# Patient Record
Sex: FEMALE | Race: OTHER | NOT HISPANIC OR LATINO
[De-identification: names, ages, dates, MRNs, and addresses within clinical notes are randomized per-mention and may not be internally consistent; named-entity substitution may affect disease eponyms.]

---

## 2017-02-17 PROBLEM — Z00.00 ENCOUNTER FOR PREVENTIVE HEALTH EXAMINATION: Status: ACTIVE | Noted: 2017-02-17

## 2017-02-21 ENCOUNTER — APPOINTMENT (OUTPATIENT)
Dept: CARDIOTHORACIC SURGERY | Facility: CLINIC | Age: 82
End: 2017-02-21

## 2017-02-21 VITALS
WEIGHT: 220 LBS | HEART RATE: 90 BPM | RESPIRATION RATE: 16 BRPM | OXYGEN SATURATION: 99 % | SYSTOLIC BLOOD PRESSURE: 133 MMHG | BODY MASS INDEX: 44.35 KG/M2 | HEIGHT: 59 IN | DIASTOLIC BLOOD PRESSURE: 82 MMHG

## 2017-02-21 DIAGNOSIS — E78.5 HYPERLIPIDEMIA, UNSPECIFIED: ICD-10-CM

## 2017-02-21 DIAGNOSIS — F41.9 ANXIETY DISORDER, UNSPECIFIED: ICD-10-CM

## 2017-02-21 DIAGNOSIS — Z87.891 PERSONAL HISTORY OF NICOTINE DEPENDENCE: ICD-10-CM

## 2017-02-21 DIAGNOSIS — I25.10 ATHEROSCLEROTIC HEART DISEASE OF NATIVE CORONARY ARTERY W/OUT ANGINA PECTORIS: ICD-10-CM

## 2017-02-21 DIAGNOSIS — I10 ESSENTIAL (PRIMARY) HYPERTENSION: ICD-10-CM

## 2017-02-21 DIAGNOSIS — I35.0 NONRHEUMATIC AORTIC (VALVE) STENOSIS: ICD-10-CM

## 2017-02-21 DIAGNOSIS — I49.5 SICK SINUS SYNDROME: ICD-10-CM

## 2017-02-21 DIAGNOSIS — I34.0 NONRHEUMATIC MITRAL (VALVE) INSUFFICIENCY: ICD-10-CM

## 2017-02-21 RX ORDER — MIRTAZAPINE 15 MG/1
15 TABLET, FILM COATED ORAL
Refills: 0 | Status: ACTIVE | COMMUNITY

## 2017-02-21 RX ORDER — ATENOLOL 25 MG/1
25 TABLET ORAL
Refills: 0 | Status: ACTIVE | COMMUNITY

## 2017-02-21 RX ORDER — FLUPHENAZINE HCL 1 MG
1 TABLET ORAL
Refills: 0 | Status: ACTIVE | COMMUNITY

## 2017-02-21 RX ORDER — MAGNESIUM OXIDE 400 MG
400 (241.3 MG) TABLET ORAL
Refills: 0 | Status: ACTIVE | COMMUNITY

## 2017-02-21 RX ORDER — SIMVASTATIN 20 MG/1
20 TABLET, FILM COATED ORAL
Refills: 0 | Status: ACTIVE | COMMUNITY

## 2017-03-17 ENCOUNTER — OUTPATIENT (OUTPATIENT)
Dept: OUTPATIENT SERVICES | Facility: HOSPITAL | Age: 82
LOS: 1 days | End: 2017-03-17
Payer: MEDICARE

## 2017-03-17 DIAGNOSIS — I35.0 NONRHEUMATIC AORTIC (VALVE) STENOSIS: ICD-10-CM

## 2017-03-17 PROCEDURE — 71275 CT ANGIOGRAPHY CHEST: CPT | Mod: 26

## 2017-03-17 PROCEDURE — 74174 CTA ABD&PLVS W/CONTRAST: CPT

## 2017-03-17 PROCEDURE — 74174 CTA ABD&PLVS W/CONTRAST: CPT | Mod: 26

## 2017-03-17 PROCEDURE — 71275 CT ANGIOGRAPHY CHEST: CPT

## 2017-03-21 ENCOUNTER — OUTPATIENT (OUTPATIENT)
Dept: OUTPATIENT SERVICES | Facility: HOSPITAL | Age: 82
LOS: 1 days | End: 2017-03-21
Payer: MEDICARE

## 2017-03-21 VITALS
SYSTOLIC BLOOD PRESSURE: 115 MMHG | DIASTOLIC BLOOD PRESSURE: 56 MMHG | HEIGHT: 61 IN | RESPIRATION RATE: 16 BRPM | TEMPERATURE: 97 F | WEIGHT: 165.35 LBS | HEART RATE: 61 BPM

## 2017-03-21 DIAGNOSIS — Z90.710 ACQUIRED ABSENCE OF BOTH CERVIX AND UTERUS: Chronic | ICD-10-CM

## 2017-03-21 DIAGNOSIS — Z01.818 ENCOUNTER FOR OTHER PREPROCEDURAL EXAMINATION: ICD-10-CM

## 2017-03-21 DIAGNOSIS — I35.0 NONRHEUMATIC AORTIC (VALVE) STENOSIS: ICD-10-CM

## 2017-03-21 LAB
ALBUMIN SERPL ELPH-MCNC: 3.8 G/DL — SIGNIFICANT CHANGE UP (ref 3.3–5.2)
ALP SERPL-CCNC: 86 U/L — SIGNIFICANT CHANGE UP (ref 40–120)
ALT FLD-CCNC: 8 U/L — SIGNIFICANT CHANGE UP
ANION GAP SERPL CALC-SCNC: 11 MMOL/L — SIGNIFICANT CHANGE UP (ref 5–17)
APTT BLD: 24.7 SEC — LOW (ref 27.5–37.4)
AST SERPL-CCNC: 17 U/L — SIGNIFICANT CHANGE UP
BASOPHILS # BLD AUTO: 0 K/UL — SIGNIFICANT CHANGE UP (ref 0–0.2)
BASOPHILS NFR BLD AUTO: 0.2 % — SIGNIFICANT CHANGE UP (ref 0–2)
BILIRUB SERPL-MCNC: 0.4 MG/DL — SIGNIFICANT CHANGE UP (ref 0.4–2)
BUN SERPL-MCNC: 20 MG/DL — SIGNIFICANT CHANGE UP (ref 8–20)
CALCIUM SERPL-MCNC: 9.4 MG/DL — SIGNIFICANT CHANGE UP (ref 8.6–10.2)
CHLORIDE SERPL-SCNC: 100 MMOL/L — SIGNIFICANT CHANGE UP (ref 98–107)
CO2 SERPL-SCNC: 29 MMOL/L — SIGNIFICANT CHANGE UP (ref 22–29)
CREAT SERPL-MCNC: 1.01 MG/DL — SIGNIFICANT CHANGE UP (ref 0.5–1.3)
EOSINOPHIL # BLD AUTO: 0.4 K/UL — SIGNIFICANT CHANGE UP (ref 0–0.5)
EOSINOPHIL NFR BLD AUTO: 9.1 % — HIGH (ref 0–6)
GLUCOSE SERPL-MCNC: 73 MG/DL — SIGNIFICANT CHANGE UP (ref 70–115)
HBA1C BLD-MCNC: 4.2 % — SIGNIFICANT CHANGE UP (ref 4–5.6)
HCT VFR BLD CALC: 40.1 % — SIGNIFICANT CHANGE UP (ref 37–47)
HGB BLD-MCNC: 12.6 G/DL — SIGNIFICANT CHANGE UP (ref 12–16)
INR BLD: 1 RATIO — SIGNIFICANT CHANGE UP (ref 0.88–1.16)
LYMPHOCYTES # BLD AUTO: 2 K/UL — SIGNIFICANT CHANGE UP (ref 1–4.8)
LYMPHOCYTES # BLD AUTO: 41.7 % — SIGNIFICANT CHANGE UP (ref 20–55)
MCHC RBC-ENTMCNC: 28.8 PG — SIGNIFICANT CHANGE UP (ref 27–31)
MCHC RBC-ENTMCNC: 31.4 G/DL — LOW (ref 32–36)
MCV RBC AUTO: 91.8 FL — SIGNIFICANT CHANGE UP (ref 81–99)
MONOCYTES # BLD AUTO: 0.5 K/UL — SIGNIFICANT CHANGE UP (ref 0–0.8)
MONOCYTES NFR BLD AUTO: 9.9 % — SIGNIFICANT CHANGE UP (ref 3–10)
MRSA PCR RESULT.: SIGNIFICANT CHANGE UP
NEUTROPHILS # BLD AUTO: 1.9 K/UL — SIGNIFICANT CHANGE UP (ref 1.8–8)
NEUTROPHILS NFR BLD AUTO: 39.1 % — SIGNIFICANT CHANGE UP (ref 37–73)
NT-PROBNP SERPL-SCNC: 484 PG/ML — HIGH (ref 0–300)
PLATELET # BLD AUTO: 142 K/UL — LOW (ref 150–400)
POTASSIUM SERPL-MCNC: 4.9 MMOL/L — SIGNIFICANT CHANGE UP (ref 3.5–5.3)
POTASSIUM SERPL-SCNC: 4.9 MMOL/L — SIGNIFICANT CHANGE UP (ref 3.5–5.3)
PREALB SERPL-MCNC: 23 MG/DL — SIGNIFICANT CHANGE UP (ref 18–38)
PROT SERPL-MCNC: 6.8 G/DL — SIGNIFICANT CHANGE UP (ref 6.6–8.7)
PROTHROM AB SERPL-ACNC: 11 SEC — SIGNIFICANT CHANGE UP (ref 10–13.1)
RBC # BLD: 4.37 M/UL — LOW (ref 4.4–5.2)
RBC # FLD: 13.2 % — SIGNIFICANT CHANGE UP (ref 11–15.6)
S AUREUS DNA NOSE QL NAA+PROBE: SIGNIFICANT CHANGE UP
SODIUM SERPL-SCNC: 140 MMOL/L — SIGNIFICANT CHANGE UP (ref 135–145)
T3 SERPL-MCNC: 98 NG/DL — SIGNIFICANT CHANGE UP (ref 80–200)
T4 AB SER-ACNC: 6.9 UG/DL — SIGNIFICANT CHANGE UP (ref 4.5–12)
TSH SERPL-MCNC: 2.24 UIU/ML — SIGNIFICANT CHANGE UP (ref 0.27–4.2)
WBC # BLD: 4.8 K/UL — SIGNIFICANT CHANGE UP (ref 4.8–10.8)
WBC # FLD AUTO: 4.8 K/UL — SIGNIFICANT CHANGE UP (ref 4.8–10.8)

## 2017-03-21 PROCEDURE — 93010 ELECTROCARDIOGRAM REPORT: CPT

## 2017-03-21 RX ORDER — CEFUROXIME AXETIL 250 MG
1500 TABLET ORAL ONCE
Qty: 0 | Refills: 0 | Status: DISCONTINUED | OUTPATIENT
Start: 2017-03-24 | End: 2017-03-25

## 2017-03-21 NOTE — PATIENT PROFILE ADULT. - PMH
Aortic stenosis    Cerebrovascular accident (CVA), unspecified mechanism  per daughter  Hypertension    Macular degeneration

## 2017-03-21 NOTE — H&P PST ADULT - PMH
Aortic stenosis    Cerebrovascular accident (CVA), unspecified mechanism  per daughter  Hypertension    Macular degeneration Aortic stenosis    Cerebrovascular accident (CVA), unspecified mechanism  per daughter  Dyslipidemia    Hypertension    Macular degeneration    Pacemaker

## 2017-03-21 NOTE — H&P PST ADULT - NSANTHOSAYNRD_GEN_A_CORE
No. RODNEY screening performed.  STOP BANG Legend: 0-2 = LOW Risk; 3-4 = INTERMEDIATE Risk; 5-8 = HIGH Risk

## 2017-03-22 DIAGNOSIS — Z01.818 ENCOUNTER FOR OTHER PREPROCEDURAL EXAMINATION: ICD-10-CM

## 2017-03-22 DIAGNOSIS — I35.0 NONRHEUMATIC AORTIC (VALVE) STENOSIS: ICD-10-CM

## 2017-03-22 LAB
BLD GP AB SCN SERPL QL: SIGNIFICANT CHANGE UP
TYPE + AB SCN PNL BLD: SIGNIFICANT CHANGE UP

## 2017-03-23 PROCEDURE — G0463: CPT

## 2017-03-23 PROCEDURE — 87641 MR-STAPH DNA AMP PROBE: CPT

## 2017-03-23 PROCEDURE — 85610 PROTHROMBIN TIME: CPT

## 2017-03-23 PROCEDURE — 86901 BLOOD TYPING SEROLOGIC RH(D): CPT

## 2017-03-23 PROCEDURE — 87640 STAPH A DNA AMP PROBE: CPT

## 2017-03-23 PROCEDURE — 84443 ASSAY THYROID STIM HORMONE: CPT

## 2017-03-23 PROCEDURE — 86850 RBC ANTIBODY SCREEN: CPT

## 2017-03-23 PROCEDURE — 93005 ELECTROCARDIOGRAM TRACING: CPT

## 2017-03-23 PROCEDURE — 85730 THROMBOPLASTIN TIME PARTIAL: CPT

## 2017-03-23 PROCEDURE — 84134 ASSAY OF PREALBUMIN: CPT

## 2017-03-23 PROCEDURE — 84436 ASSAY OF TOTAL THYROXINE: CPT

## 2017-03-23 PROCEDURE — 84480 ASSAY TRIIODOTHYRONINE (T3): CPT

## 2017-03-23 PROCEDURE — 85027 COMPLETE CBC AUTOMATED: CPT

## 2017-03-23 PROCEDURE — 86920 COMPATIBILITY TEST SPIN: CPT

## 2017-03-23 PROCEDURE — 83036 HEMOGLOBIN GLYCOSYLATED A1C: CPT

## 2017-03-23 PROCEDURE — 86900 BLOOD TYPING SEROLOGIC ABO: CPT

## 2017-03-23 PROCEDURE — 83880 ASSAY OF NATRIURETIC PEPTIDE: CPT

## 2017-03-23 PROCEDURE — 80053 COMPREHEN METABOLIC PANEL: CPT

## 2017-03-24 ENCOUNTER — INPATIENT (INPATIENT)
Facility: HOSPITAL | Age: 82
LOS: 0 days | Discharge: HOME | End: 2017-03-25
Attending: THORACIC SURGERY (CARDIOTHORACIC VASCULAR SURGERY) | Admitting: THORACIC SURGERY (CARDIOTHORACIC VASCULAR SURGERY)
Payer: MEDICARE

## 2017-03-24 ENCOUNTER — APPOINTMENT (OUTPATIENT)
Dept: CARDIOTHORACIC SURGERY | Facility: HOSPITAL | Age: 82
End: 2017-03-24

## 2017-03-24 ENCOUNTER — OUTPATIENT (OUTPATIENT)
Dept: OUTPATIENT SERVICES | Facility: HOSPITAL | Age: 82
LOS: 1 days | Discharge: HOME | End: 2017-03-24
Payer: MEDICARE

## 2017-03-24 VITALS
SYSTOLIC BLOOD PRESSURE: 134 MMHG | RESPIRATION RATE: 16 BRPM | OXYGEN SATURATION: 100 % | HEART RATE: 66 BPM | WEIGHT: 165.35 LBS | HEIGHT: 61 IN | DIASTOLIC BLOOD PRESSURE: 62 MMHG | TEMPERATURE: 98 F

## 2017-03-24 DIAGNOSIS — I35.0 NONRHEUMATIC AORTIC (VALVE) STENOSIS: ICD-10-CM

## 2017-03-24 DIAGNOSIS — E78.5 HYPERLIPIDEMIA, UNSPECIFIED: ICD-10-CM

## 2017-03-24 DIAGNOSIS — I10 ESSENTIAL (PRIMARY) HYPERTENSION: ICD-10-CM

## 2017-03-24 DIAGNOSIS — Z90.710 ACQUIRED ABSENCE OF BOTH CERVIX AND UTERUS: Chronic | ICD-10-CM

## 2017-03-24 DIAGNOSIS — Z95.0 PRESENCE OF CARDIAC PACEMAKER: ICD-10-CM

## 2017-03-24 DIAGNOSIS — Z86.73 PERSONAL HISTORY OF TRANSIENT ISCHEMIC ATTACK (TIA), AND CEREBRAL INFARCTION WITHOUT RESIDUAL DEFICITS: ICD-10-CM

## 2017-03-24 LAB
ABO RH CONFIRMATION: SIGNIFICANT CHANGE UP
ALBUMIN SERPL ELPH-MCNC: 3 G/DL — LOW (ref 3.3–5.2)
ALP SERPL-CCNC: 66 U/L — SIGNIFICANT CHANGE UP (ref 40–120)
ALT FLD-CCNC: 7 U/L — SIGNIFICANT CHANGE UP
ANION GAP SERPL CALC-SCNC: 10 MMOL/L — SIGNIFICANT CHANGE UP (ref 5–17)
APTT BLD: 31 SEC — SIGNIFICANT CHANGE UP (ref 27.5–37.4)
AST SERPL-CCNC: 21 U/L — SIGNIFICANT CHANGE UP
BILIRUB SERPL-MCNC: 0.5 MG/DL — SIGNIFICANT CHANGE UP (ref 0.4–2)
BUN SERPL-MCNC: 18 MG/DL — SIGNIFICANT CHANGE UP (ref 8–20)
CALCIUM SERPL-MCNC: 8.1 MG/DL — LOW (ref 8.6–10.2)
CHLORIDE SERPL-SCNC: 107 MMOL/L — SIGNIFICANT CHANGE UP (ref 98–107)
CK SERPL-CCNC: 97 U/L — SIGNIFICANT CHANGE UP (ref 25–170)
CO2 SERPL-SCNC: 24 MMOL/L — SIGNIFICANT CHANGE UP (ref 22–29)
CREAT SERPL-MCNC: 0.71 MG/DL — SIGNIFICANT CHANGE UP (ref 0.5–1.3)
GAS PNL BLDA: SIGNIFICANT CHANGE UP
GLUCOSE SERPL-MCNC: 100 MG/DL — SIGNIFICANT CHANGE UP (ref 70–115)
HCT VFR BLD CALC: 32.5 % — LOW (ref 37–47)
HGB BLD-MCNC: 10.5 G/DL — LOW (ref 12–16)
INR BLD: 1.07 RATIO — SIGNIFICANT CHANGE UP (ref 0.88–1.16)
MAGNESIUM SERPL-MCNC: 1.9 MG/DL — SIGNIFICANT CHANGE UP (ref 1.8–2.5)
MCHC RBC-ENTMCNC: 29 PG — SIGNIFICANT CHANGE UP (ref 27–31)
MCHC RBC-ENTMCNC: 32.3 G/DL — SIGNIFICANT CHANGE UP (ref 32–36)
MCV RBC AUTO: 89.8 FL — SIGNIFICANT CHANGE UP (ref 81–99)
PLATELET # BLD AUTO: 92 K/UL — LOW (ref 150–400)
POTASSIUM SERPL-MCNC: 4.2 MMOL/L — SIGNIFICANT CHANGE UP (ref 3.5–5.3)
POTASSIUM SERPL-SCNC: 4.2 MMOL/L — SIGNIFICANT CHANGE UP (ref 3.5–5.3)
PROT SERPL-MCNC: 5.3 G/DL — LOW (ref 6.6–8.7)
PROTHROM AB SERPL-ACNC: 11.8 SEC — SIGNIFICANT CHANGE UP (ref 9.8–12.7)
RBC # BLD: 3.62 M/UL — LOW (ref 4.4–5.2)
RBC # FLD: 12.9 % — SIGNIFICANT CHANGE UP (ref 11–15.6)
SODIUM SERPL-SCNC: 141 MMOL/L — SIGNIFICANT CHANGE UP (ref 135–145)
TROPONIN T SERPL-MCNC: 0.34 NG/ML — HIGH (ref 0–0.06)
WBC # BLD: 4.9 K/UL — SIGNIFICANT CHANGE UP (ref 4.8–10.8)
WBC # FLD AUTO: 4.9 K/UL — SIGNIFICANT CHANGE UP (ref 4.8–10.8)

## 2017-03-24 PROCEDURE — 71010: CPT | Mod: 26

## 2017-03-24 PROCEDURE — 93355 ECHO TRANSESOPHAGEAL (TEE): CPT

## 2017-03-24 PROCEDURE — 36569 INSJ PICC 5 YR+ W/O IMAGING: CPT

## 2017-03-24 PROCEDURE — 33362 REPLACE AORTIC VALVE OPEN: CPT | Mod: 62,Q0

## 2017-03-24 RX ORDER — MAGNESIUM OXIDE 400 MG ORAL TABLET 241.3 MG
1 TABLET ORAL
Qty: 0 | Refills: 0 | COMMUNITY

## 2017-03-24 RX ORDER — CHOLECALCIFEROL (VITAMIN D3) 125 MCG
2 CAPSULE ORAL
Qty: 0 | Refills: 0 | COMMUNITY

## 2017-03-24 RX ORDER — ASPIRIN/CALCIUM CARB/MAGNESIUM 324 MG
325 TABLET ORAL DAILY
Qty: 0 | Refills: 0 | Status: DISCONTINUED | OUTPATIENT
Start: 2017-03-25 | End: 2017-03-25

## 2017-03-24 RX ORDER — TRAVOPROST 0.04 MG/ML
1 SOLUTION/ DROPS OPHTHALMIC
Qty: 0 | Refills: 0 | COMMUNITY

## 2017-03-24 RX ORDER — MULTIVIT-MIN/FERROUS GLUCONATE 9 MG/15 ML
1 LIQUID (ML) ORAL
Qty: 0 | Refills: 0 | COMMUNITY

## 2017-03-24 RX ORDER — CEFUROXIME AXETIL 250 MG
1500 TABLET ORAL EVERY 8 HOURS
Qty: 0 | Refills: 0 | Status: DISCONTINUED | OUTPATIENT
Start: 2017-03-24 | End: 2017-03-25

## 2017-03-24 RX ORDER — ASPIRIN/CALCIUM CARB/MAGNESIUM 324 MG
325 TABLET ORAL ONCE
Qty: 0 | Refills: 0 | Status: COMPLETED | OUTPATIENT
Start: 2017-03-24 | End: 2017-03-24

## 2017-03-24 RX ORDER — SODIUM CHLORIDE 9 MG/ML
1000 INJECTION INTRAMUSCULAR; INTRAVENOUS; SUBCUTANEOUS
Qty: 0 | Refills: 0 | Status: DISCONTINUED | OUTPATIENT
Start: 2017-03-24 | End: 2017-03-24

## 2017-03-24 RX ORDER — SODIUM CHLORIDE 9 MG/ML
375.5 INJECTION, SOLUTION INTRAVENOUS ONCE
Qty: 0 | Refills: 0 | Status: COMPLETED | OUTPATIENT
Start: 2017-03-24 | End: 2017-03-24

## 2017-03-24 RX ORDER — DOCUSATE SODIUM 100 MG
100 CAPSULE ORAL THREE TIMES A DAY
Qty: 0 | Refills: 0 | Status: DISCONTINUED | OUTPATIENT
Start: 2017-03-24 | End: 2017-03-25

## 2017-03-24 RX ORDER — PANTOPRAZOLE SODIUM 20 MG/1
40 TABLET, DELAYED RELEASE ORAL
Qty: 0 | Refills: 0 | Status: DISCONTINUED | OUTPATIENT
Start: 2017-03-24 | End: 2017-03-25

## 2017-03-24 RX ORDER — SODIUM CHLORIDE 9 MG/ML
250 INJECTION, SOLUTION INTRAVENOUS ONCE
Qty: 0 | Refills: 0 | Status: COMPLETED | OUTPATIENT
Start: 2017-03-24 | End: 2017-03-24

## 2017-03-24 RX ORDER — PREGABALIN 225 MG/1
1 CAPSULE ORAL
Qty: 0 | Refills: 0 | COMMUNITY

## 2017-03-24 RX ORDER — CLOPIDOGREL BISULFATE 75 MG/1
75 TABLET, FILM COATED ORAL ONCE
Qty: 0 | Refills: 0 | Status: DISCONTINUED | OUTPATIENT
Start: 2017-03-24 | End: 2017-03-24

## 2017-03-24 RX ORDER — ATENOLOL 25 MG/1
1 TABLET ORAL
Qty: 0 | Refills: 0 | COMMUNITY

## 2017-03-24 RX ORDER — ACETAMINOPHEN 500 MG
1000 TABLET ORAL ONCE
Qty: 0 | Refills: 0 | Status: COMPLETED | OUTPATIENT
Start: 2017-03-24 | End: 2017-03-24

## 2017-03-24 RX ORDER — NOREPINEPHRINE BITARTRATE/D5W 8 MG/250ML
0.01 PLASTIC BAG, INJECTION (ML) INTRAVENOUS
Qty: 8 | Refills: 0 | Status: DISCONTINUED | OUTPATIENT
Start: 2017-03-24 | End: 2017-03-24

## 2017-03-24 RX ORDER — NYSTATIN CREAM 100000 [USP'U]/G
1 CREAM TOPICAL EVERY 8 HOURS
Qty: 0 | Refills: 0 | Status: DISCONTINUED | OUTPATIENT
Start: 2017-03-24 | End: 2017-03-25

## 2017-03-24 RX ORDER — CLOPIDOGREL BISULFATE 75 MG/1
75 TABLET, FILM COATED ORAL DAILY
Qty: 0 | Refills: 0 | Status: DISCONTINUED | OUTPATIENT
Start: 2017-03-25 | End: 2017-03-25

## 2017-03-24 RX ORDER — SODIUM CHLORIDE 9 MG/ML
3 INJECTION INTRAMUSCULAR; INTRAVENOUS; SUBCUTANEOUS EVERY 8 HOURS
Qty: 0 | Refills: 0 | Status: DISCONTINUED | OUTPATIENT
Start: 2017-03-24 | End: 2017-03-24

## 2017-03-24 RX ORDER — SIMVASTATIN 20 MG/1
1 TABLET, FILM COATED ORAL
Qty: 0 | Refills: 0 | COMMUNITY

## 2017-03-24 RX ORDER — MIRTAZAPINE 45 MG/1
1 TABLET, ORALLY DISINTEGRATING ORAL
Qty: 0 | Refills: 0 | COMMUNITY

## 2017-03-24 RX ORDER — ACETAMINOPHEN 500 MG
2 TABLET ORAL
Qty: 0 | Refills: 0 | COMMUNITY

## 2017-03-24 RX ORDER — HYDROMORPHONE HYDROCHLORIDE 2 MG/ML
0.25 INJECTION INTRAMUSCULAR; INTRAVENOUS; SUBCUTANEOUS
Qty: 0 | Refills: 0 | Status: DISCONTINUED | OUTPATIENT
Start: 2017-03-24 | End: 2017-03-24

## 2017-03-24 RX ORDER — FLUPHENAZINE HYDROCHLORIDE 1 MG/1
1 TABLET, FILM COATED ORAL
Qty: 0 | Refills: 0 | COMMUNITY

## 2017-03-24 RX ORDER — POLYETHYLENE GLYCOL 3350 17 G/17G
17 POWDER, FOR SOLUTION ORAL
Qty: 0 | Refills: 0 | COMMUNITY

## 2017-03-24 RX ADMIN — HYDROMORPHONE HYDROCHLORIDE 0.25 MILLIGRAM(S): 2 INJECTION INTRAMUSCULAR; INTRAVENOUS; SUBCUTANEOUS at 18:30

## 2017-03-24 RX ADMIN — Medication 100 MILLIGRAM(S): at 22:21

## 2017-03-24 RX ADMIN — Medication 400 MILLIGRAM(S): at 11:18

## 2017-03-24 RX ADMIN — HYDROMORPHONE HYDROCHLORIDE 0.25 MILLIGRAM(S): 2 INJECTION INTRAMUSCULAR; INTRAVENOUS; SUBCUTANEOUS at 18:00

## 2017-03-24 RX ADMIN — NYSTATIN CREAM 1 APPLICATION(S): 100000 CREAM TOPICAL at 22:20

## 2017-03-24 RX ADMIN — HYDROMORPHONE HYDROCHLORIDE 0.25 MILLIGRAM(S): 2 INJECTION INTRAMUSCULAR; INTRAVENOUS; SUBCUTANEOUS at 18:15

## 2017-03-24 RX ADMIN — Medication 325 MILLIGRAM(S): at 22:23

## 2017-03-24 RX ADMIN — NYSTATIN CREAM 1 APPLICATION(S): 100000 CREAM TOPICAL at 13:08

## 2017-03-24 RX ADMIN — Medication 1000 MILLIGRAM(S): at 11:33

## 2017-03-24 RX ADMIN — Medication 100 MILLIGRAM(S): at 17:12

## 2017-03-24 RX ADMIN — SODIUM CHLORIDE 1000 MILLILITER(S): 9 INJECTION, SOLUTION INTRAVENOUS at 15:27

## 2017-03-24 RX ADMIN — SODIUM CHLORIDE 498.45 MILLILITER(S): 9 INJECTION, SOLUTION INTRAVENOUS at 18:39

## 2017-03-24 RX ADMIN — Medication 100 MILLIGRAM(S): at 15:24

## 2017-03-24 NOTE — CHART NOTE - NSCHARTNOTEFT_GEN_A_CORE
Commercial Bravo Transfemoral TAVR via Right Femoral Cutdown.  NCT# [id number], STS/ACC TVT Registry ID# [id number].

## 2017-03-24 NOTE — BRIEF OPERATIVE NOTE - PROCEDURE
TAVR, transfemoral approach  03/24/2017  Transfemoral TAVR via Right Femoral Cutdown (23mm Dilshad S3) (NCT# [id number]) (STS/ACC TVT Registry ID# [id number])  Active  [email or web address]

## 2017-03-24 NOTE — BRIEF OPERATIVE NOTE - COMMENTS
Invasive Lines: Right Radial Arterial Line, Left Femoral Arterial & Venous Sheaths  IV Medication Infusions: None

## 2017-03-24 NOTE — BRIEF OPERATIVE NOTE - POST-OP DX
Chronic diastolic CHF (congestive heart failure), NYHA class 3  03/24/2017    Active  Dheeraj Wright  Severe aortic stenosis  03/24/2017    Active  Dheeraj Wright

## 2017-03-25 ENCOUNTER — TRANSCRIPTION ENCOUNTER (OUTPATIENT)
Age: 82
End: 2017-03-25

## 2017-03-25 VITALS — SYSTOLIC BLOOD PRESSURE: 119 MMHG | HEART RATE: 63 BPM | OXYGEN SATURATION: 100 % | DIASTOLIC BLOOD PRESSURE: 56 MMHG

## 2017-03-25 DIAGNOSIS — I35.0 NONRHEUMATIC AORTIC (VALVE) STENOSIS: ICD-10-CM

## 2017-03-25 DIAGNOSIS — I10 ESSENTIAL (PRIMARY) HYPERTENSION: ICD-10-CM

## 2017-03-25 LAB
ALBUMIN SERPL ELPH-MCNC: 3 G/DL — LOW (ref 3.3–5.2)
ALP SERPL-CCNC: 70 U/L — SIGNIFICANT CHANGE UP (ref 40–120)
ALT FLD-CCNC: 6 U/L — SIGNIFICANT CHANGE UP
ANION GAP SERPL CALC-SCNC: 7 MMOL/L — SIGNIFICANT CHANGE UP (ref 5–17)
APTT BLD: 30.6 SEC — SIGNIFICANT CHANGE UP (ref 27.5–37.4)
AST SERPL-CCNC: 19 U/L — SIGNIFICANT CHANGE UP
BILIRUB SERPL-MCNC: 0.4 MG/DL — SIGNIFICANT CHANGE UP (ref 0.4–2)
BUN SERPL-MCNC: 19 MG/DL — SIGNIFICANT CHANGE UP (ref 8–20)
CALCIUM SERPL-MCNC: 8.3 MG/DL — LOW (ref 8.6–10.2)
CHLORIDE SERPL-SCNC: 105 MMOL/L — SIGNIFICANT CHANGE UP (ref 98–107)
CK SERPL-CCNC: 53 U/L — SIGNIFICANT CHANGE UP (ref 25–170)
CO2 SERPL-SCNC: 27 MMOL/L — SIGNIFICANT CHANGE UP (ref 22–29)
CREAT SERPL-MCNC: 0.68 MG/DL — SIGNIFICANT CHANGE UP (ref 0.5–1.3)
GLUCOSE SERPL-MCNC: 89 MG/DL — SIGNIFICANT CHANGE UP (ref 70–115)
HCT VFR BLD CALC: 29.9 % — LOW (ref 37–47)
HGB BLD-MCNC: 9.4 G/DL — LOW (ref 12–16)
INR BLD: 1.04 RATIO — SIGNIFICANT CHANGE UP (ref 0.88–1.16)
MAGNESIUM SERPL-MCNC: 2.1 MG/DL — SIGNIFICANT CHANGE UP (ref 1.8–2.5)
MCHC RBC-ENTMCNC: 28.8 PG — SIGNIFICANT CHANGE UP (ref 27–31)
MCHC RBC-ENTMCNC: 31.4 G/DL — LOW (ref 32–36)
MCV RBC AUTO: 91.7 FL — SIGNIFICANT CHANGE UP (ref 81–99)
PLATELET # BLD AUTO: 97 K/UL — LOW (ref 150–400)
POTASSIUM SERPL-MCNC: 4.7 MMOL/L — SIGNIFICANT CHANGE UP (ref 3.5–5.3)
POTASSIUM SERPL-SCNC: 4.7 MMOL/L — SIGNIFICANT CHANGE UP (ref 3.5–5.3)
PROT SERPL-MCNC: 5.1 G/DL — LOW (ref 6.6–8.7)
PROTHROM AB SERPL-ACNC: 11.4 SEC — SIGNIFICANT CHANGE UP (ref 9.8–12.7)
RBC # BLD: 3.26 M/UL — LOW (ref 4.4–5.2)
RBC # FLD: 13.2 % — SIGNIFICANT CHANGE UP (ref 11–15.6)
SODIUM SERPL-SCNC: 139 MMOL/L — SIGNIFICANT CHANGE UP (ref 135–145)
TROPONIN T SERPL-MCNC: 0.16 NG/ML — HIGH (ref 0–0.06)
WBC # BLD: 5.1 K/UL — SIGNIFICANT CHANGE UP (ref 4.8–10.8)
WBC # FLD AUTO: 5.1 K/UL — SIGNIFICANT CHANGE UP (ref 4.8–10.8)

## 2017-03-25 PROCEDURE — 82435 ASSAY OF BLOOD CHLORIDE: CPT

## 2017-03-25 PROCEDURE — 85027 COMPLETE CBC AUTOMATED: CPT

## 2017-03-25 PROCEDURE — 82947 ASSAY GLUCOSE BLOOD QUANT: CPT

## 2017-03-25 PROCEDURE — 80053 COMPREHEN METABOLIC PANEL: CPT

## 2017-03-25 PROCEDURE — 83735 ASSAY OF MAGNESIUM: CPT

## 2017-03-25 PROCEDURE — C1889: CPT

## 2017-03-25 PROCEDURE — 93325 DOPPLER ECHO COLOR FLOW MAPG: CPT

## 2017-03-25 PROCEDURE — C1769: CPT

## 2017-03-25 PROCEDURE — 76000 FLUOROSCOPY <1 HR PHYS/QHP: CPT

## 2017-03-25 PROCEDURE — 71045 X-RAY EXAM CHEST 1 VIEW: CPT

## 2017-03-25 PROCEDURE — 33361 REPLACE AORTIC VALVE PERQ: CPT

## 2017-03-25 PROCEDURE — 93005 ELECTROCARDIOGRAM TRACING: CPT

## 2017-03-25 PROCEDURE — 85014 HEMATOCRIT: CPT

## 2017-03-25 PROCEDURE — 84132 ASSAY OF SERUM POTASSIUM: CPT

## 2017-03-25 PROCEDURE — 85610 PROTHROMBIN TIME: CPT

## 2017-03-25 PROCEDURE — 93320 DOPPLER ECHO COMPLETE: CPT

## 2017-03-25 PROCEDURE — 82803 BLOOD GASES ANY COMBINATION: CPT

## 2017-03-25 PROCEDURE — 85730 THROMBOPLASTIN TIME PARTIAL: CPT

## 2017-03-25 PROCEDURE — 82330 ASSAY OF CALCIUM: CPT

## 2017-03-25 PROCEDURE — 82550 ASSAY OF CK (CPK): CPT

## 2017-03-25 PROCEDURE — 93306 TTE W/DOPPLER COMPLETE: CPT

## 2017-03-25 PROCEDURE — 97163 PT EVAL HIGH COMPLEX 45 MIN: CPT

## 2017-03-25 PROCEDURE — 93010 ELECTROCARDIOGRAM REPORT: CPT

## 2017-03-25 PROCEDURE — C1894: CPT

## 2017-03-25 PROCEDURE — L8699: CPT

## 2017-03-25 PROCEDURE — 93312 ECHO TRANSESOPHAGEAL: CPT

## 2017-03-25 PROCEDURE — 83605 ASSAY OF LACTIC ACID: CPT

## 2017-03-25 PROCEDURE — 71010: CPT | Mod: 26

## 2017-03-25 PROCEDURE — 84484 ASSAY OF TROPONIN QUANT: CPT

## 2017-03-25 PROCEDURE — C1887: CPT

## 2017-03-25 PROCEDURE — 84295 ASSAY OF SERUM SODIUM: CPT

## 2017-03-25 PROCEDURE — 92953 TEMPORARY EXTERNAL PACING: CPT

## 2017-03-25 RX ORDER — ASPIRIN/CALCIUM CARB/MAGNESIUM 324 MG
1 TABLET ORAL
Qty: 0 | Refills: 0 | COMMUNITY

## 2017-03-25 RX ORDER — NYSTATIN CREAM 100000 [USP'U]/G
1 CREAM TOPICAL
Qty: 0 | Refills: 0 | COMMUNITY
Start: 2017-03-25

## 2017-03-25 RX ORDER — CLOPIDOGREL BISULFATE 75 MG/1
1 TABLET, FILM COATED ORAL
Qty: 0 | Refills: 0 | COMMUNITY
Start: 2017-03-25

## 2017-03-25 RX ORDER — PANTOPRAZOLE SODIUM 20 MG/1
1 TABLET, DELAYED RELEASE ORAL
Qty: 0 | Refills: 0 | COMMUNITY
Start: 2017-03-25

## 2017-03-25 RX ORDER — DOCUSATE SODIUM 100 MG
1 CAPSULE ORAL
Qty: 0 | Refills: 0 | COMMUNITY
Start: 2017-03-25

## 2017-03-25 RX ORDER — ATENOLOL 25 MG/1
25 TABLET ORAL DAILY
Qty: 0 | Refills: 0 | Status: DISCONTINUED | OUTPATIENT
Start: 2017-03-25 | End: 2017-03-25

## 2017-03-25 RX ORDER — PANTOPRAZOLE SODIUM 20 MG/1
40 TABLET, DELAYED RELEASE ORAL
Qty: 0 | Refills: 0 | Status: DISCONTINUED | OUTPATIENT
Start: 2017-03-25 | End: 2017-03-25

## 2017-03-25 RX ADMIN — PANTOPRAZOLE SODIUM 40 MILLIGRAM(S): 20 TABLET, DELAYED RELEASE ORAL at 07:21

## 2017-03-25 RX ADMIN — Medication 100 MILLIGRAM(S): at 07:20

## 2017-03-25 RX ADMIN — Medication 100 MILLIGRAM(S): at 06:02

## 2017-03-25 RX ADMIN — Medication 100 MILLIGRAM(S): at 11:39

## 2017-03-25 RX ADMIN — Medication 325 MILLIGRAM(S): at 11:39

## 2017-03-25 RX ADMIN — ATENOLOL 25 MILLIGRAM(S): 25 TABLET ORAL at 10:23

## 2017-03-25 RX ADMIN — CLOPIDOGREL BISULFATE 75 MILLIGRAM(S): 75 TABLET, FILM COATED ORAL at 11:39

## 2017-03-25 NOTE — DISCHARGE NOTE ADULT - MEDICATION SUMMARY - MEDICATIONS TO CHANGE
I will SWITCH the dose or number of times a day I take the medications listed below when I get home from the hospital:  None I will SWITCH the dose or number of times a day I take the medications listed below when I get home from the hospital:    Aspirin Enteric Coated 81 mg oral delayed release tablet  -- 1 tab(s) by mouth once a day

## 2017-03-25 NOTE — DISCHARGE NOTE ADULT - NS AS ACTIVITY OBS
Walking-Indoors allowed/Showering allowed/Walking-Outdoors allowed/Do not make important decisions/No Heavy lifting/straining/Do not drive or operate machinery

## 2017-03-25 NOTE — DISCHARGE NOTE ADULT - PATIENT PORTAL LINK FT
“You can access the Follow[employer] Patient Portal, offered by [facility], by registering with the following website: [email or web address]”

## 2017-03-25 NOTE — PROGRESS NOTE ADULT - SUBJECTIVE AND OBJECTIVE BOX
Patient is a 87y old  Female who presents with a chief complaint of SOB. POD #1 TAVR    Interval/Overnight Events: Overnight, patient remained in NSR with PPM, stale hemodynamics, resting comfortably.     VITAL SIGNS:  T(C): 36.4, Max: 36.7 (03-24 @ 10:19)  HR: 60 (60 - 72)  BP: 131/60 (112/56 - 146/66)  ABP: 114/39 (114/39 - 161/64)  ABP(mean): 62 (62 - 99)  RR: 14 (10 - 26)  SpO2: 100% (96% - 100%)      RESPIRATORY:  [] FiO2: 		[] Heliox	[] BiPAP:   [x] NC			[] HFNC:    Liters, FiO2:  [] End-Tidal CO2:  [] Mechanical Ventilation:     ABG - ( 24 Mar 2017 10:50 )  pH: 7.34  /  pCO2: 48    /  pO2: 164   / HCO3: 24    / Base Excess: -0.6  /  SaO2: 100   / Lactate: x          Respiratory Medications:    [] Extubation Readiness Assessed  Comments:    CARDIOVASCULAR    Cardiac Rhythm:	[x] NSR		[] Other:  Comments:    HEMATOLOGIC/ONCOLOGIC:                        10.5   4.9   )-----------( 92       ( 24 Mar 2017 10:49 )             32.5     PT/INR - ( 24 Mar 2017 10:49 )   PT: 11.8 sec;   INR: 1.07 ratio         PTT - ( 24 Mar 2017 10:49 )  PTT:31.0 sec  Transfusions:	[] PRBC	[] Platelets	[] FFP		[] Cryoprecipitate    Hematologic/Oncologic Medications:  aspirin enteric coated 325milliGRAM(s) Oral daily  clopidogrel Tablet 75milliGRAM(s) Oral daily    INFECTIOUS DISEASE:  Antimicrobials/Immunologic Medications:  cefuroxime  IVPB 1500milliGRAM(s) IV Intermittent once  cefuroxime  IVPB 1500milliGRAM(s) IV Intermittent every 8 hours    Cultures:    FLUIDS/ELECTROLYTES/NUTRITION:  I&O's Summary    I & Os for current day (as of 25 Mar 2017 03:52)  =============================================  IN: 1415 ml / OUT: 720 ml / NET: 695 ml    Daily Weight in k.6 (24 Mar 2017 10:19)  24 Mar 2017 10:49    141    |  107    |  18.0   ----------------------------<  100    4.2     |  24.0   |  0.71     Ca    8.1        24 Mar 2017 10:49  Mg     1.9       24 Mar 2017 10:49    TPro  5.3    /  Alb  3.0    /  TBili  0.5    /  DBili  x      /  AST  21     /  ALT  7      /  AlkPhos  66     24 Mar 2017 10:49      Diet:	[x] Regular	[] Soft		[] Clears	[] NPO  .	[] Other:  .	[] NGT		[] NDT		[] GT		[] GJT    Gastrointestinal Medications:  pantoprazole   Suspension 40milliGRAM(s) Oral before breakfast  docusate sodium 100milliGRAM(s) Oral three times a day      OTHER MEDICATIONS:  Endocrine/Metabolic Medications:    Genitourinary Medications:    Topical/Other Medications:  nystatin Powder 1Application(s) Topical every 8 hours      PATIENT CARE ACCESS DEVICES:  [x] Peripheral IV  [] Central Venous Line	[] R	[] L	[] IJ	[] Fem	[] SC			[] Placed:  [] Arterial Line		[] R	[] L	[] PT	[] DP	[] Fem	[] Rad	[] Ax	[] Placed:  [] PICC:				[] Broviac		[] Mediport  [] Urinary Catheter, Date Placed:  [] Necessity of urinary, arterial, and venous catheters discussed    PHYSICAL EXAM:  Respiratory:		[x] Normal  Breath Sounds:		[] Normal  .	Rhonchi	[] Right		[] Left  .	Wheezing	[] Right		[] Left  .	Diminished	[] Right		[] Left  .	Crackles	[] Right		[] Left  Effort:		[x] Even unlabored	[] Nasal Flaring		[] Grunting  .		[] Stridor		[] Retractions		[] Ventilator assisted  Comments:    Cardiovascular:		[x] Normal  Murmur:		[x] None		[] Present:  Capillary Refill		x[] Brisk, less than 2 seconds	[] Prolonged:  Pulses:			[x] Equal and strong		[] Other:  Comments:    Abdominal:	[x] Normal  [x] Soft		[] Distended	[] Tender	[] Taut		[] Rigid		[] BS Absent  Comments:    Skin:		[x] Normal  comments: Patient had R groin cutdown and L groin sheaths, now removed, no hematoma or active bleeding  Edema:		[x] None		[] Generalized	[] 1+	[] 2+	[] 3+	[] 4+  Rash:		[x] None		[] Present:  Comments:    Neurologic:	[x] Normal  		[] Alert		[] Sedated	[] No acute change from baseline  Comments:      IMAGING STUDIES:    Patient and Parent/Guardian updated as to the progress/plan of care:	[x] Yes	[] No
[facility]                                   [address] [place] [address]                                                      PHONE: [phone]                                                         FAX: [phone]  [email or web address]  ---------------------------------------------------------------------------------------------------------------------------------    Overnight events/patient :no complaints      Lasix (Hypotension)  No Known Allergies    MEDICATIONS  (STANDING):  cefuroxime  IVPB 1500milliGRAM(s) IV Intermittent once  docusate sodium 100milliGRAM(s) Oral three times a day  aspirin enteric coated 325milliGRAM(s) Oral daily  clopidogrel Tablet 75milliGRAM(s) Oral daily  cefuroxime  IVPB 1500milliGRAM(s) IV Intermittent every 8 hours  nystatin Powder 1Application(s) Topical every 8 hours  pantoprazole    Tablet 40milliGRAM(s) Oral before breakfast  ATENolol  Tablet 25milliGRAM(s) Oral daily    MEDICATIONS  (PRN):      Vital Signs Last 24 Hrs  T(C): 36.4, Max: 36.7 (-24 @ 10:19)  T(F): 97.5, Max: 98 (03-24 @ 10:19)  HR: 72 (60 - 72)  BP: 110/514 (110/514 - 146/66)  BP(mean): 73 (73 - 95)  RR: 18 (10 - 26)  SpO2: 98% (96% - 100%)  Daily     Daily Weight in k.6 (24 Mar 2017 10:19)  ICU Vital Signs Last 24 Hrs  WILEY STEWART  I&O's Detail    I & Os for current day (as of 25 Mar 2017 08:59)  =============================================  IN:    Oral Fluid: 640 ml    Lactated Ringers IV Bolus: 625 ml    Solution: 100 ml    Solution: 100 ml    Total IN: 1465 ml  ---------------------------------------------  OUT:    Indwelling Catheter - Urethral: 720 ml    Total OUT: 720 ml  ---------------------------------------------  Total NET: 745 ml    I&O's Summary    I & Os for current day (as of 25 Mar 2017 08:59)  =============================================  IN: 1465 ml / OUT: 720 ml / NET: 745 ml    Drug Dosing Weight  WILEY STEWART      PHYSICAL EXAM:  General: Appears well developed, well nourished alert and cooperative.  HEENT: Head; normocephalic, atraumatic.  Eyes: Pupils reactive, cornea wnl.  Neck: Supple, no nodes adenopathy, no NVD or carotid bruit or thyromegaly.  CARDIOVASCULAR: Normal S1 and S2, soft systolic murmur, rub, gallop or lift.   LUNGS: No rales, rhonchi or wheeze. Normal breath sounds bilaterally.  ABDOMEN: Soft, nontender without mass or organomegaly. bowel sounds normoactive.  EXTREMITIES: No clubbing, cyanosis or edema. Distal pulses wnl.   SKIN: warm and dry with normal turgor.  NEURO: Alert/oriented x 3/normal motor exam. No pathologic reflexes.    PSYCH: normal affect.        LABS:                        9.4    5.1   )-----------( 97       ( 25 Mar 2017 04:03 )             29.9     25 Mar 2017 04:03    139    |  105    |  19.0   ----------------------------<  89     4.7     |  27.0   |  0.68     Ca    8.3        25 Mar 2017 04:03  Mg     2.1       25 Mar 2017 04:03    TPro  5.1    /  Alb  3.0    /  TBili  0.4    /  DBili  x      /  AST  19     /  ALT  6      /  AlkPhos  70     25 Mar 2017 04:03    WILEY STEWART  CARDIAC MARKERS ( 25 Mar 2017 04:03 )  x     / 0.16 ng/mL / 53 U/L / x     / x      CARDIAC MARKERS ( 24 Mar 2017 10:49 )  x     / 0.34 ng/mL / 97 U/L / x     / x          PT/INR - ( 25 Mar 2017 04:03 )   PT: 11.4 sec;   INR: 1.04 ratio         PTT - ( 25 Mar 2017 04:03 )  PTT:30.6 sec      RADIOLOGY & ADDITIONAL STUDIES:    INTERPRETATION OF TELEMETRY (personally reviewed):    ECG:Diagnosis Line AV dual-paced rhythm  Abnormal ECG    Confirmed by JOHN HANNA ([id number]) on 3/21/2017 10:20:38 P

## 2017-03-25 NOTE — DISCHARGE NOTE ADULT - HOSPITAL COURSE
patient admitted as same day for TF TAVR on 3/24, no intraop issues. pt was extubated in OR and transferred to CTICU for monitoring. Her groin sheaths were removed. Pt remained hemodynamically stable and is ready for discharge on POD 1.  Her pre discharge TTE showed: patient admitted as same day for TF TAVR on 3/24, no intraop issues. pt was extubated in OR and transferred to CTICU for monitoring. Her groin sheaths were removed. Pt remained hemodynamically stable and is ready for discharge on POD 1.  Her pre discharge TTE showed EF 55%, grade II diastolic dysfunction, mild to mod TR, trivial pericardial effusion.  Pt is stable for discharge back to rehab as discussed with Dr. Mirza.

## 2017-03-25 NOTE — PROGRESS NOTE ADULT - PROBLEM SELECTOR PLAN 1
S/P TAVR. Continue ASA and Plavix. Monitor Platelet count. Follow up ECHO to r/o pleural effusion. F/U ECG to r/o arrhythmias.

## 2017-03-25 NOTE — DISCHARGE NOTE ADULT - CARE PLAN
Goal:	recover from surgery  Instructions for follow-up, activity and diet:	Follow up with Dr. Mirza in 2 weeks. Please call [phone] to arrange appointment  Follow up with cardiology once discharged from rehab. Please call to make an appointment. Dr. Shannon [phone].  Please come to ED or Call Cardio thoracic office at [phone] if Chest pain, Shortness of Breath, persistant Nausea & vomiting, oozing from wounds, 2 lb increase in weight in 24 hours.  please check daily weights.  shower daily. Ok to get incision wet. clean with soap and water. pat dry. do not put any lotions or creams on the wound.  Use interdry for b/l groins to prevent skin breakdown from moisture.  Instructions for follow-up, activity and diet:	. Goal:	recover from surgery  Instructions for follow-up, activity and diet:	Follow up with Dr. Mirza in 2 weeks. Please call [phone] to arrange appointment  Follow up with cardiology once discharged from rehab. Please call to make an appointment. Dr. Shannon [phone].  Please come to ED or Call Cardio thoracic office at [phone] if Chest pain, Shortness of Breath, persistant Nausea & vomiting, oozing from wounds, 2 lb increase in weight in 24 hours.  please check daily weights.  shower daily. Ok to get incision wet. clean with soap and water. pat dry. do not put any lotions or creams on the wound.  Use interdry for b/l groins to prevent skin breakdown from moisture.  Instructions for follow-up, activity and diet:	. Goal:	recover from surgery  Instructions for follow-up, activity and diet:	Follow up with Dr. Mirza in 2 weeks. Please call [phone] to arrange appointment  Follow up with cardiology once discharged from rehab. Please call to make an appointment. Dr. Shannon [phone].  Please come to ED or Call Cardio thoracic office at [phone] if Chest pain, Shortness of Breath, persistant Nausea & vomiting, oozing from wounds, 2 lb increase in weight in 24 hours.  please check daily weights.  shower daily. Ok to get incision wet. clean with soap and water. pat dry. do not put any lotions or creams on the wound.  Use interdry for b/l groins to prevent skin breakdown from moisture.  Instructions for follow-up, activity and diet:	. Principal Discharge DX:	Aortic valve stenosis, unspecified etiology  Goal:	s/p TF- TAVR, recover from surgery  Instructions for follow-up, activity and diet:	Follow up with Dr. Mirza in 2 weeks. Please call [phone] to arrange appointment  Follow up with cardiology once discharged from rehab. Please call to make an appointment. Dr. Shannon [phone].  Please come to ED or Call Cardio thoracic office at [phone] if Chest pain, Shortness of Breath, persistant Nausea & vomiting, oozing from wounds, 2 lb increase in weight in 24 hours.  please check daily weights.  shower daily. Ok to get incision wet. clean with soap and water. pat dry. do not put any lotions or creams on the wound.  Use interdry for b/l groins to prevent skin breakdown from moisture.  Instructions for follow-up, activity and diet:	. Principal Discharge DX:	Aortic valve stenosis, unspecified etiology  Goal:	s/p TF- TAVR, recover from surgery  Instructions for follow-up, activity and diet:	Follow up with Dr. Mirza in 2 weeks. Please call [phone] to arrange appointment  Follow up with cardiology once discharged from rehab. Please call to make an appointment. Dr. Shannon [phone].  Please come to ED or Call Cardio thoracic office at [phone] if Chest pain, Shortness of Breath, persistant Nausea & vomiting, oozing from wounds, 2 lb increase in weight in 24 hours.  please check daily weights.  shower daily. Ok to get incision wet. clean with soap and water. pat dry. do not put any lotions or creams on the wound.  Use interdry for b/l groins to prevent skin breakdown from moisture.  Instructions for follow-up, activity and diet:	. Principal Discharge DX:	Aortic valve stenosis, unspecified etiology  Goal:	s/p TF- TAVR, recover from surgery  Instructions for follow-up, activity and diet:	Follow up with Dr. Mirza in 2 weeks. Please call [phone] to arrange appointment  Follow up with cardiology once discharged from rehab. Please call to make an appointment. Dr. Shannon [phone].  Please come to ED or Call Cardio thoracic office at [phone] if Chest pain, Shortness of Breath, persistant Nausea & vomiting, oozing from wounds, 2 lb increase in weight in 24 hours.  please check daily weights.  shower daily. Ok to get incision wet. clean with soap and water. pat dry. do not put any lotions or creams on the wound.  Use interdry for b/l groins to prevent skin breakdown from moisture.  Instructions for follow-up, activity and diet:	. Principal Discharge DX:	Aortic valve stenosis, unspecified etiology  Goal:	s/p TF- TAVR, recover from surgery  Instructions for follow-up, activity and diet:	Follow up with Dr. Mirza in 2 weeks. Please call [phone] to arrange appointment  Follow up with cardiology once discharged from rehab. Please call to make an appointment. Dr. Shannon [phone].  Please come to ED or Call Cardio thoracic office at [phone] if Chest pain, Shortness of Breath, persistant Nausea & vomiting, oozing from wounds, 2 lb increase in weight in 24 hours.  please check daily weights.  shower daily. Ok to get incision wet. clean with soap and water. pat dry. do not put any lotions or creams on the wound.  Use interdry for b/l groins to prevent skin breakdown from moisture.  Instructions for follow-up, activity and diet:	.

## 2017-03-25 NOTE — DISCHARGE NOTE ADULT - ADDITIONAL INSTRUCTIONS
Follow up with Dr. Mirza in 2 weeks. Please call [phone] to arrange appointment.  Follow up with cardiology upon discharge from rehab. Dr. Shannon [phone]  Daily weights  daily showers  monitor incisions for redness, swelling, drainage. monitor for fever. Call [phone] (Dr. mirza's office) if any occur.   Clean incision with soap and water. pat dry. do not apply any lotions or creams to incision.

## 2017-03-25 NOTE — DISCHARGE NOTE ADULT - PLAN OF CARE
recover from surgery Follow up with Dr. Mirza in 2 weeks. Please call [phone] to arrange appointment  Follow up with cardiology once discharged from rehab. Please call to make an appointment. Dr. Shannon [phone].  Please come to ED or Call Cardio thoracic office at [phone] if Chest pain, Shortness of Breath, persistant Nausea & vomiting, oozing from wounds, 2 lb increase in weight in 24 hours.  please check daily weights.  shower daily. Ok to get incision wet. clean with soap and water. pat dry. do not put any lotions or creams on the wound.  Use interdry for b/l groins to prevent skin breakdown from moisture. . s/p TF- TAVR, recover from surgery

## 2017-03-25 NOTE — PHYSICAL THERAPY INITIAL EVALUATION ADULT - ADDITIONAL COMMENTS
pt confirmed she lives with her daughter in a house. states she walked with a walker and had "a lady" who helped her. per chart, has 8-6 monday-friday, some hours saturday.

## 2017-03-25 NOTE — PHYSICAL THERAPY INITIAL EVALUATION ADULT - CRITERIA FOR SKILLED THERAPEUTIC INTERVENTIONS
anticipated discharge recommendation/functional limitations in following categories/risk reduction/prevention/anticipated equipment needs at discharge/impairments found/rehab potential/therapy frequency

## 2017-03-25 NOTE — DISCHARGE NOTE ADULT - MEDICATION SUMMARY - MEDICATIONS TO TAKE
I will START or STAY ON the medications listed below when I get home from the hospital:    acetaminophen 325 mg oral tablet  -- 2 tab(s) by mouth every 4 hours, As Needed  -- Indication: For pain    Aspirin Enteric Coated 81 mg oral delayed release tablet  -- 1 tab(s) by mouth once a day  -- Indication: For blood thinner    Remeron 15 mg oral tablet  -- 1 tab(s) by mouth once a day (at bedtime)  -- Indication: For for sleep    simvastatin 20 mg oral tablet  -- 1 tab(s) by mouth once a day (at bedtime)  -- Indication: For for cholesterol    clopidogrel 75 mg oral tablet  -- 1 tab(s) by mouth once a day  -- Indication: For blood thinner    Prolixin  -- 1 milligram(s) by mouth once a day (at bedtime)  -- Indication: For for anxiety    atenolol 25 mg oral tablet  -- 1 tab(s) by mouth once a day  -- Indication: For Heart rate and blood pressure     nystatin 100,000 units/g topical powder  -- 1 application on skin every 8 hours  -- Indication: For for fungas in groin    cranberry oral capsule  -- 1 cap(s) by mouth once a day  -- Indication: For Nutritional supplement    docusate sodium 100 mg oral capsule  -- 1 cap(s) by mouth 3 times a day  -- Indication: For stool softener    MiraLax oral powder for reconstitution  -- 17 gram(s) by mouth once a day  -- Indication: For for constipation    magnesium oxide 400 mg (241.3 mg elemental magnesium) oral tablet  -- 1 tab(s) by mouth once a day (at bedtime)  -- Indication: For Nutritional supplement    Travatan 0.004% ophthalmic solution  -- 1 drop(s) to each affected eye once a day (in the evening)  -- Indication: For eye drops    pantoprazole 40 mg oral delayed release tablet  -- 1 tab(s) by mouth once a day (before a meal)  -- Indication: For for reflux    PreserVision oral tablet  -- 1 tab(s) by mouth 2 times a day  -- Indication: For Nutritional supplement    Vitamin D3 400 intl units oral tablet  -- 2 tab(s) by mouth once a day  -- Indication: For Nutritional supplement    Vitamin B-12 1000 mcg oral tablet  -- 1 tab(s) by mouth once a day  -- Indication: For Nutritional supplement I will START or STAY ON the medications listed below when I get home from the hospital:    acetaminophen 325 mg oral tablet  -- 2 tab(s) by mouth every 4 hours, As Needed  -- Indication: For pain    Aspirin Enteric Coated 325 mg oral delayed release tablet  -- 1 tab(s) by mouth once a day  -- Indication: For blood thinner    Remeron 15 mg oral tablet  -- 1 tab(s) by mouth once a day (at bedtime)  -- Indication: For for sleep    simvastatin 20 mg oral tablet  -- 1 tab(s) by mouth once a day (at bedtime)  -- Indication: For for cholesterol    clopidogrel 75 mg oral tablet  -- 1 tab(s) by mouth once a day  -- Indication: For blood thinner    Prolixin  -- 1 milligram(s) by mouth once a day (at bedtime)  -- Indication: For for anxiety    atenolol 25 mg oral tablet  -- 1 tab(s) by mouth once a day  -- Indication: For Heart rate and blood pressure     nystatin 100,000 units/g topical powder  -- 1 application on skin every 8 hours  -- Indication: For for fungas in groin    cranberry oral capsule  -- 1 cap(s) by mouth once a day  -- Indication: For Nutritional supplement    MiraLax oral powder for reconstitution  -- 17 gram(s) by mouth once a day  -- Indication: For for constipation    docusate sodium 100 mg oral capsule  -- 1 cap(s) by mouth 3 times a day  -- Indication: For stool softener    magnesium oxide 400 mg (241.3 mg elemental magnesium) oral tablet  -- 1 tab(s) by mouth once a day (at bedtime)  -- Indication: For Nutritional supplement    Travatan 0.004% ophthalmic solution  -- 1 drop(s) to each affected eye once a day (in the evening)  -- Indication: For eye drops    pantoprazole 40 mg oral delayed release tablet  -- 1 tab(s) by mouth once a day (before a meal)  -- Indication: For for reflux    PreserVision oral tablet  -- 1 tab(s) by mouth 2 times a day  -- Indication: For Nutritional supplement    Vitamin D3 400 intl units oral tablet  -- 2 tab(s) by mouth once a day  -- Indication: For Nutritional supplement    Vitamin B-12 1000 mcg oral tablet  -- 1 tab(s) by mouth once a day  -- Indication: For Nutritional supplement

## 2017-03-25 NOTE — DISCHARGE NOTE ADULT - REASON FOR ADMISSION
aortic stenosis  s/p TF-TAVR via R groin cut down (Dilshad) aortic stenosis  s/p TF-TAVR via R groin cut down (Dilshad) 3/24

## 2017-03-25 NOTE — PROGRESS NOTE ADULT - ASSESSMENT
87 year old female POD #1 TAVR, hemodynamically stable on no pressors/inotropes. Patient is in NSR, has preop PPM. Groins are without hematoma or active bleeding, TVP wire and sheath have been removed. Patient is a candidate for discharge today.
Assessment  s/p TAVR for S  preop normal EF  DDD pacer    Rec  Cont current Rx

## 2017-03-25 NOTE — DISCHARGE NOTE ADULT - CARE PROVIDERS DIRECT ADDRESSES
,DirectAddress_Unknown,[email or web address]

## 2017-03-27 RX ORDER — ALBUTEROL SULFATE 0.63 MG/3ML
0.63 SOLUTION RESPIRATORY (INHALATION)
Refills: 0 | Status: DISCONTINUED | COMMUNITY
End: 2017-03-27

## 2017-03-27 RX ORDER — IPRATROPIUM BROMIDE 0.2 MG/ML
0.02 SOLUTION, NON-ORAL INHALATION
Refills: 0 | Status: DISCONTINUED | COMMUNITY
End: 2017-03-27

## 2017-03-27 RX ORDER — ASPIRIN/ACETAMINOPHEN/CAFFEINE 500-325-65
325 POWDER IN PACKET (EA) ORAL
Refills: 0 | Status: ACTIVE | COMMUNITY

## 2017-03-27 RX ORDER — ASPIRIN 81 MG
81 TABLET, DELAYED RELEASE (ENTERIC COATED) ORAL
Refills: 0 | Status: DISCONTINUED | COMMUNITY
End: 2017-03-27

## 2017-03-27 RX ORDER — CLOPIDOGREL 75 MG/1
75 TABLET, FILM COATED ORAL
Refills: 0 | Status: ACTIVE | COMMUNITY

## 2017-03-27 RX ORDER — TRAVOPROST (BENZALKONIUM) 0.004 %
0 DROPS OPHTHALMIC (EYE)
Refills: 0 | Status: ACTIVE | COMMUNITY

## 2017-04-04 ENCOUNTER — APPOINTMENT (OUTPATIENT)
Dept: CARDIOTHORACIC SURGERY | Facility: CLINIC | Age: 82
End: 2017-04-04

## 2017-04-04 VITALS
RESPIRATION RATE: 16 BRPM | HEART RATE: 65 BPM | SYSTOLIC BLOOD PRESSURE: 137 MMHG | WEIGHT: 220 LBS | BODY MASS INDEX: 44.35 KG/M2 | DIASTOLIC BLOOD PRESSURE: 78 MMHG | HEIGHT: 59 IN | OXYGEN SATURATION: 96 %

## 2017-04-10 DIAGNOSIS — I35.0 NONRHEUMATIC AORTIC (VALVE) STENOSIS: ICD-10-CM

## 2017-04-20 ENCOUNTER — APPOINTMENT (OUTPATIENT)
Dept: CARDIOTHORACIC SURGERY | Facility: CLINIC | Age: 82
End: 2017-04-20

## 2017-04-20 VITALS
OXYGEN SATURATION: 93 % | DIASTOLIC BLOOD PRESSURE: 76 MMHG | HEART RATE: 70 BPM | WEIGHT: 220 LBS | SYSTOLIC BLOOD PRESSURE: 121 MMHG | BODY MASS INDEX: 44.35 KG/M2 | HEIGHT: 59 IN | RESPIRATION RATE: 16 BRPM

## 2022-08-15 NOTE — H&P PST ADULT - PSY GEN HX ROS MEA POS PC
Returned call to patient stating if tests are done in a [facility] we should have the results for scheduled appt and if tests are done outside [address] to please direct that facility to send the results asap to the office and we should still get them by her appt on 8/25. paranoia/depression/anxiety

## 2023-03-07 NOTE — H&P PST ADULT - PROBLEM SELECTOR PROBLEM 1
Pt was seen and examined  Agree with above  F/U with  oncology after CT completion
AS (aortic stenosis)

## 2023-08-03 NOTE — DISCHARGE NOTE ADULT - CARE PROVIDER_API CALL
Name: Radha Mcgovern  YOB: 1967  Gender: female  MRN: [id number]    Allergies   Allergen Reactions    Sulfa Antibiotics Hives and Rash    Adhesive Tape Other (See Comments)     Itching, rash    Lisinopril Other (See Comments)       CC:  bilateral knee pain, Osteoarthritis    PROCEDURE:  HA injection(s). All questions answered. Procedure Note: The patient was placed in upright position with both knees hanging freely from exam table. Both knees were prepped in sterile fashion using alcohol wipe(s). Using an infrapatellar approach, 3cc of Durolane was injected freely. The needle was then removed, pressure hemostatis achieved, injection site was cleansed with alcohol wipe and dressed with band aid. The patient tolerated the procedure without complication. The patient  will follow up as scheduled.     KYLER Panda  08/03/23 Des Shannon), Cardiovascular Disease; Interventional Cardiology  [address] [place] [address]  Phone: [phone]  Fax: [phone]    Riki Mirza), Surgery; Thoracic and Cardiac Surgery  [address] [place] [address]  Phone: [phone]  Fax: [phone]